# Patient Record
Sex: FEMALE | Race: WHITE | NOT HISPANIC OR LATINO | URBAN - METROPOLITAN AREA
[De-identification: names, ages, dates, MRNs, and addresses within clinical notes are randomized per-mention and may not be internally consistent; named-entity substitution may affect disease eponyms.]

---

## 2022-07-09 ENCOUNTER — TELEPHONE ENCOUNTER (OUTPATIENT)
Dept: URBAN - METROPOLITAN AREA CLINIC 121 | Facility: CLINIC | Age: 72
End: 2022-07-09

## 2022-07-10 ENCOUNTER — TELEPHONE ENCOUNTER (OUTPATIENT)
Dept: URBAN - METROPOLITAN AREA CLINIC 121 | Facility: CLINIC | Age: 72
End: 2022-07-10

## 2022-07-30 ENCOUNTER — TELEPHONE ENCOUNTER (OUTPATIENT)
Age: 72
End: 2022-07-30

## 2022-07-31 ENCOUNTER — TELEPHONE ENCOUNTER (OUTPATIENT)
Age: 72
End: 2022-07-31

## 2023-02-16 ENCOUNTER — OFFICE VISIT (OUTPATIENT)
Dept: URBAN - METROPOLITAN AREA CLINIC 9 | Facility: CLINIC | Age: 73
End: 2023-02-16

## 2023-03-06 ENCOUNTER — OFFICE VISIT (OUTPATIENT)
Dept: URBAN - METROPOLITAN AREA CLINIC 9 | Facility: CLINIC | Age: 73
End: 2023-03-06
Payer: MEDICARE

## 2023-03-06 VITALS
HEIGHT: 63 IN | WEIGHT: 156 LBS | BODY MASS INDEX: 27.64 KG/M2 | SYSTOLIC BLOOD PRESSURE: 120 MMHG | DIASTOLIC BLOOD PRESSURE: 74 MMHG

## 2023-03-06 DIAGNOSIS — K21.9 GASTROESOPHAGEAL REFLUX DISEASE, UNSPECIFIED WHETHER ESOPHAGITIS PRESENT: ICD-10-CM

## 2023-03-06 DIAGNOSIS — R10.11 RUQ ABDOMINAL PAIN: ICD-10-CM

## 2023-03-06 DIAGNOSIS — Z12.11 SCREENING FOR COLON CANCER: ICD-10-CM

## 2023-03-06 PROCEDURE — 99204 OFFICE O/P NEW MOD 45 MIN: CPT | Performed by: STUDENT IN AN ORGANIZED HEALTH CARE EDUCATION/TRAINING PROGRAM

## 2023-03-06 PROCEDURE — 99244 OFF/OP CNSLTJ NEW/EST MOD 40: CPT | Performed by: STUDENT IN AN ORGANIZED HEALTH CARE EDUCATION/TRAINING PROGRAM

## 2023-03-06 RX ORDER — PANTOPRAZOLE SODIUM 20 MG/1
1 TABLET TABLET, DELAYED RELEASE ORAL ONCE A DAY
OUTPATIENT
Start: 2023-03-06

## 2023-03-06 RX ORDER — SERTRALINE 50 MG/1
TAKE 1 TABLET BY MOUTH DAILY TABLET, FILM COATED ORAL
Qty: 90 EACH | Refills: 1 | Status: ACTIVE | COMMUNITY

## 2023-03-06 RX ORDER — SUCRALFATE 1 G/1
1 TABLET ON AN EMPTY STOMACH TABLET ORAL TWICE A DAY
Qty: 60 | Status: ACTIVE | COMMUNITY
Start: 2023-03-06 | End: 2023-04-05

## 2023-03-06 RX ORDER — THYROID 60 MG/1
1 TABLET ON AN EMPTY STOMACH TABLET ORAL ONCE A DAY
Qty: 30 | Status: ACTIVE | COMMUNITY
Start: 2023-03-06

## 2023-03-06 RX ORDER — PANTOPRAZOLE SODIUM 20 MG/1
1 TABLET TABLET, DELAYED RELEASE ORAL ONCE A DAY
Qty: 30 | Status: ACTIVE | COMMUNITY
Start: 2023-03-06

## 2023-03-06 RX ORDER — ROSUVASTATIN CALCIUM 20 MG/1
TABLET, FILM COATED ORAL
Qty: 90 TABLET | Refills: 0 | Status: ACTIVE | COMMUNITY

## 2023-03-06 RX ORDER — FLUTICASONE PROPIONATE 50 UG/1
1 SPRAY IN EACH NOSTRIL SPRAY, METERED NASAL ONCE A DAY
Status: ACTIVE | COMMUNITY
Start: 2023-03-06

## 2023-03-06 RX ORDER — NITROGLYCERIN 0.4 MG/1
AS DIRECTED TABLET SUBLINGUAL
Status: ACTIVE | COMMUNITY
Start: 2023-03-06

## 2023-03-06 RX ORDER — TRAZODONE HYDROCHLORIDE 100 MG/1
1 TABLET AT BEDTIME TABLET ORAL ONCE A DAY
Qty: 30 | Status: ACTIVE | COMMUNITY
Start: 2023-03-06

## 2023-03-06 RX ORDER — PROPRANOLOL HYDROCHLORIDE 10 MG/1
TAKE 1 TABLET BY MOUTH THREE TIMES A DAY TABLET ORAL
Qty: 270 EACH | Refills: 0 | Status: ACTIVE | COMMUNITY

## 2023-03-06 RX ORDER — SUCRALFATE 1 G/1
1 TABLET ON AN EMPTY STOMACH TABLET ORAL TWICE A DAY
OUTPATIENT
Start: 2023-03-06 | End: 2023-04-05

## 2023-03-06 RX ORDER — FEXOFENADINE HYDROCHLORIDE 180 MG/1
1 TABLET SWALLOW WHOLE WITH WATER; DO NOT TAKE WITH FRUIT JUICES TABLET ORAL ONCE A DAY
Qty: 30 | Status: ACTIVE | COMMUNITY
Start: 2023-03-06 | End: 2023-04-05

## 2023-03-06 RX ORDER — PROGESTERONE 100 MG/1
AS DIRECTED CAPSULE ORAL
Status: ACTIVE | COMMUNITY
Start: 2023-03-06

## 2023-03-06 NOTE — HPI-TODAY'S VISIT:
Patient has a history of allergies, GERD, CAD s/p stent 8/2010, hypothyroidism who presents for abd pain  GI Hx: GERD had been well controlled but as of more recently has bothered her. 20 years of RMQ and RUQ abd pain, stabbing pain, worse 15 min after eating,   Denies weight loss, fever, chills, nausea, vomiting, diarrhea.  Denies dysphagia. Denies hematemesis, melena, hematochezia, blood per rectum.  EGD: No recent  Colonoscopy: 10 year ago- no records available.  Imaging/Studies/Procedures: No recent imaging.

## 2023-03-10 LAB
A/G RATIO: 1.7
ALBUMIN: 4.2
ALKALINE PHOSPHATASE: 67
ALT (SGPT): 12
AST (SGOT): 14
BASO (ABSOLUTE): 0
BASOS: 1
BILIRUBIN, TOTAL: 0.6
BUN/CREATININE RATIO: 15
BUN: 14
CALCIUM: 9.1
CARBON DIOXIDE, TOTAL: 23
CHLORIDE: 103
CREATININE: 0.94
EGFR: 65
EOS (ABSOLUTE): 0.2
EOS: 2
GLOBULIN, TOTAL: 2.5
GLUCOSE: 90
HEMATOCRIT: 41.1
HEMOGLOBIN: 14.3
IMMATURE GRANS (ABS): 0.1
IMMATURE GRANULOCYTES: 1
LYMPHS (ABSOLUTE): 1.5
LYMPHS: 19
MCH: 33.3
MCHC: 34.8
MCV: 96
MONOCYTES(ABSOLUTE): 0.6
MONOCYTES: 8
NEUTROPHILS (ABSOLUTE): 5.4
NEUTROPHILS: 69
PLATELETS: 214
POTASSIUM: 4.4
PROTEIN, TOTAL: 6.7
RBC: 4.29
RDW: 12.2
SODIUM: 138
WBC: 7.8

## 2023-03-18 PROBLEM — 235595009: Status: ACTIVE | Noted: 2023-03-18

## 2023-03-24 ENCOUNTER — TELEPHONE ENCOUNTER (OUTPATIENT)
Dept: URBAN - METROPOLITAN AREA CLINIC 9 | Facility: CLINIC | Age: 73
End: 2023-03-24

## 2023-03-30 ENCOUNTER — TELEPHONE ENCOUNTER (OUTPATIENT)
Dept: URBAN - METROPOLITAN AREA CLINIC 9 | Facility: CLINIC | Age: 73
End: 2023-03-30

## 2023-04-10 ENCOUNTER — CLAIMS CREATED FROM THE CLAIM WINDOW (OUTPATIENT)
Dept: URBAN - METROPOLITAN AREA CLINIC 4 | Facility: CLINIC | Age: 73
End: 2023-04-10
Payer: MEDICARE

## 2023-04-10 ENCOUNTER — OFFICE VISIT (OUTPATIENT)
Dept: URBAN - METROPOLITAN AREA SURGERY CENTER 9 | Facility: SURGERY CENTER | Age: 73
End: 2023-04-10
Payer: MEDICARE

## 2023-04-10 DIAGNOSIS — K29.70 CHRONIC ACITVE GASTRITIS (H.PYLORI NEGATIVE): ICD-10-CM

## 2023-04-10 DIAGNOSIS — K62.3 RECTAL PROLAPSE: ICD-10-CM

## 2023-04-10 DIAGNOSIS — K44.9 DIAPHRAGMATIC HERNIA: ICD-10-CM

## 2023-04-10 DIAGNOSIS — K31.7 BENIGN GASTRIC POLYP: ICD-10-CM

## 2023-04-10 DIAGNOSIS — K31.89 ACQUIRED DEFORMITY OF DUODENUM: ICD-10-CM

## 2023-04-10 DIAGNOSIS — K22.89 DILATATION OF ESOPHAGUS: ICD-10-CM

## 2023-04-10 DIAGNOSIS — K31.89 OTHER DISEASES OF STOMACH AND DUODENUM: ICD-10-CM

## 2023-04-10 PROCEDURE — 43239 EGD BIOPSY SINGLE/MULTIPLE: CPT | Performed by: CLINIC/CENTER

## 2023-04-10 PROCEDURE — 88305 TISSUE EXAM BY PATHOLOGIST: CPT | Performed by: PATHOLOGY

## 2023-04-10 PROCEDURE — 43239 EGD BIOPSY SINGLE/MULTIPLE: CPT | Performed by: STUDENT IN AN ORGANIZED HEALTH CARE EDUCATION/TRAINING PROGRAM

## 2023-04-10 PROCEDURE — 43251 EGD REMOVE LESION SNARE: CPT | Performed by: STUDENT IN AN ORGANIZED HEALTH CARE EDUCATION/TRAINING PROGRAM

## 2023-04-10 PROCEDURE — 88312 SPECIAL STAINS GROUP 1: CPT | Performed by: PATHOLOGY

## 2023-04-10 PROCEDURE — 43251 EGD REMOVE LESION SNARE: CPT | Performed by: CLINIC/CENTER

## 2023-04-10 RX ORDER — SERTRALINE 50 MG/1
TAKE 1 TABLET BY MOUTH DAILY TABLET, FILM COATED ORAL
Qty: 90 EACH | Refills: 1 | Status: ACTIVE | COMMUNITY

## 2023-04-10 RX ORDER — PANTOPRAZOLE SODIUM 20 MG/1
1 TABLET TABLET, DELAYED RELEASE ORAL ONCE A DAY
OUTPATIENT
Start: 2023-03-06

## 2023-04-10 RX ORDER — OMEPRAZOLE 40 MG/1
1 CAPSULE 30 MINUTES BEFORE LUNCH OR DINNER CAPSULE, DELAYED RELEASE ORAL ONCE A DAY
Qty: 30 | Refills: 5 | OUTPATIENT
Start: 2023-04-10

## 2023-04-10 RX ORDER — PROPRANOLOL HYDROCHLORIDE 10 MG/1
TAKE 1 TABLET BY MOUTH THREE TIMES A DAY TABLET ORAL
Qty: 270 EACH | Refills: 0 | Status: ACTIVE | COMMUNITY

## 2023-04-10 RX ORDER — FLUTICASONE PROPIONATE 50 UG/1
1 SPRAY IN EACH NOSTRIL SPRAY, METERED NASAL ONCE A DAY
Status: ACTIVE | COMMUNITY
Start: 2023-03-06

## 2023-04-10 RX ORDER — ROSUVASTATIN CALCIUM 20 MG/1
TABLET, FILM COATED ORAL
Qty: 90 TABLET | Refills: 0 | Status: ACTIVE | COMMUNITY

## 2023-04-10 RX ORDER — NITROGLYCERIN 0.4 MG/1
AS DIRECTED TABLET SUBLINGUAL
Status: ACTIVE | COMMUNITY
Start: 2023-03-06

## 2023-04-10 RX ORDER — TRAZODONE HYDROCHLORIDE 100 MG/1
1 TABLET AT BEDTIME TABLET ORAL ONCE A DAY
Qty: 30 | Status: ACTIVE | COMMUNITY
Start: 2023-03-06

## 2023-04-10 RX ORDER — PROGESTERONE 100 MG/1
AS DIRECTED CAPSULE ORAL
Status: ACTIVE | COMMUNITY
Start: 2023-03-06

## 2023-04-10 RX ORDER — THYROID 60 MG/1
1 TABLET ON AN EMPTY STOMACH TABLET ORAL ONCE A DAY
Qty: 30 | Status: ACTIVE | COMMUNITY
Start: 2023-03-06

## 2023-04-10 RX ORDER — PANTOPRAZOLE SODIUM 20 MG/1
1 TABLET TABLET, DELAYED RELEASE ORAL ONCE A DAY
Status: ACTIVE | COMMUNITY
Start: 2023-03-06

## 2023-04-19 ENCOUNTER — TELEPHONE ENCOUNTER (OUTPATIENT)
Dept: URBAN - METROPOLITAN AREA CLINIC 9 | Facility: CLINIC | Age: 73
End: 2023-04-19

## 2023-04-26 ENCOUNTER — TELEPHONE ENCOUNTER (OUTPATIENT)
Dept: URBAN - METROPOLITAN AREA CLINIC 9 | Facility: CLINIC | Age: 73
End: 2023-04-26

## 2023-05-02 ENCOUNTER — CLAIMS CREATED FROM THE CLAIM WINDOW (OUTPATIENT)
Dept: URBAN - METROPOLITAN AREA CLINIC 4 | Facility: CLINIC | Age: 73
End: 2023-05-02
Payer: MEDICARE

## 2023-05-02 ENCOUNTER — OFFICE VISIT (OUTPATIENT)
Dept: URBAN - METROPOLITAN AREA SURGERY CENTER 9 | Facility: SURGERY CENTER | Age: 73
End: 2023-05-02
Payer: MEDICARE

## 2023-05-02 DIAGNOSIS — K57.30 ACQUIRED DIVERTICULOSIS OF COLON: ICD-10-CM

## 2023-05-02 DIAGNOSIS — Z12.11 AVERAGE RISK FOR CRC. DUE TO PT'S CO-MORBID STATE WITH END STAGE DEMENTIA, HIGH RISK FOR ANESTHESIA (PER NEUROLOGY); INABILITY TO TAKE A BOWEL PREP....WOULD NOT ADVISE ANY COLORECTAL CANCER SCREENING INCLUDING STOOL TEST FOR FECAL BLOOD.: ICD-10-CM

## 2023-05-02 DIAGNOSIS — K63.5 BENIGN COLON POLYP: ICD-10-CM

## 2023-05-02 DIAGNOSIS — D12.2 BENIGN NEOPLASM OF ASCENDING COLON: ICD-10-CM

## 2023-05-02 DIAGNOSIS — K64.0 BLEEDING GRADE I HEMORRHOIDS: ICD-10-CM

## 2023-05-02 PROCEDURE — 45385 COLONOSCOPY W/LESION REMOVAL: CPT | Performed by: CLINIC/CENTER

## 2023-05-02 PROCEDURE — 45385 COLONOSCOPY W/LESION REMOVAL: CPT | Performed by: STUDENT IN AN ORGANIZED HEALTH CARE EDUCATION/TRAINING PROGRAM

## 2023-05-02 PROCEDURE — 88305 TISSUE EXAM BY PATHOLOGIST: CPT | Performed by: PATHOLOGY

## 2023-05-02 RX ORDER — OMEPRAZOLE 40 MG/1
1 CAPSULE 30 MINUTES BEFORE LUNCH OR DINNER CAPSULE, DELAYED RELEASE ORAL ONCE A DAY
Qty: 30 | Refills: 5 | Status: ACTIVE | COMMUNITY
Start: 2023-04-10

## 2023-05-02 RX ORDER — ROSUVASTATIN CALCIUM 20 MG/1
TABLET, FILM COATED ORAL
Qty: 90 TABLET | Refills: 0 | Status: ACTIVE | COMMUNITY

## 2023-05-02 RX ORDER — PROGESTERONE 100 MG/1
AS DIRECTED CAPSULE ORAL
Status: ACTIVE | COMMUNITY
Start: 2023-03-06

## 2023-05-02 RX ORDER — PROPRANOLOL HYDROCHLORIDE 10 MG/1
TAKE 1 TABLET BY MOUTH THREE TIMES A DAY TABLET ORAL
Qty: 270 EACH | Refills: 0 | Status: ACTIVE | COMMUNITY

## 2023-05-02 RX ORDER — SERTRALINE 50 MG/1
TAKE 1 TABLET BY MOUTH DAILY TABLET, FILM COATED ORAL
Qty: 90 EACH | Refills: 1 | Status: ACTIVE | COMMUNITY

## 2023-05-02 RX ORDER — TRAZODONE HYDROCHLORIDE 100 MG/1
1 TABLET AT BEDTIME TABLET ORAL ONCE A DAY
Qty: 30 | Status: ACTIVE | COMMUNITY
Start: 2023-03-06

## 2023-05-02 RX ORDER — THYROID 60 MG/1
1 TABLET ON AN EMPTY STOMACH TABLET ORAL ONCE A DAY
Qty: 30 | Status: ACTIVE | COMMUNITY
Start: 2023-03-06

## 2023-05-02 RX ORDER — FLUTICASONE PROPIONATE 50 UG/1
1 SPRAY IN EACH NOSTRIL SPRAY, METERED NASAL ONCE A DAY
Status: ACTIVE | COMMUNITY
Start: 2023-03-06

## 2023-05-02 RX ORDER — NITROGLYCERIN 0.4 MG/1
AS DIRECTED TABLET SUBLINGUAL
Status: ACTIVE | COMMUNITY
Start: 2023-03-06

## 2023-05-09 ENCOUNTER — OFFICE VISIT (OUTPATIENT)
Dept: URBAN - METROPOLITAN AREA CLINIC 9 | Facility: CLINIC | Age: 73
End: 2023-05-09
Payer: MEDICARE

## 2023-05-09 VITALS
SYSTOLIC BLOOD PRESSURE: 108 MMHG | BODY MASS INDEX: 28.17 KG/M2 | WEIGHT: 159 LBS | HEIGHT: 63 IN | DIASTOLIC BLOOD PRESSURE: 68 MMHG

## 2023-05-09 DIAGNOSIS — R10.11 RUQ ABDOMINAL PAIN: ICD-10-CM

## 2023-05-09 DIAGNOSIS — K80.50 BILIARY COLIC: ICD-10-CM

## 2023-05-09 PROCEDURE — 99214 OFFICE O/P EST MOD 30 MIN: CPT | Performed by: STUDENT IN AN ORGANIZED HEALTH CARE EDUCATION/TRAINING PROGRAM

## 2023-05-09 RX ORDER — TRAZODONE HYDROCHLORIDE 100 MG/1
1 TABLET AT BEDTIME TABLET ORAL ONCE A DAY
Qty: 30 | Status: ACTIVE | COMMUNITY
Start: 2023-03-06

## 2023-05-09 RX ORDER — FLUTICASONE PROPIONATE 50 UG/1
1 SPRAY IN EACH NOSTRIL SPRAY, METERED NASAL ONCE A DAY
Status: ACTIVE | COMMUNITY
Start: 2023-03-06

## 2023-05-09 RX ORDER — SERTRALINE 50 MG/1
TAKE 1 TABLET BY MOUTH DAILY TABLET, FILM COATED ORAL
Qty: 90 EACH | Refills: 1 | Status: ACTIVE | COMMUNITY

## 2023-05-09 RX ORDER — ROSUVASTATIN CALCIUM 20 MG/1
TABLET, FILM COATED ORAL
Qty: 90 TABLET | Refills: 0 | Status: ACTIVE | COMMUNITY

## 2023-05-09 RX ORDER — PROPRANOLOL HYDROCHLORIDE 10 MG/1
TAKE 1 TABLET BY MOUTH THREE TIMES A DAY TABLET ORAL
Qty: 270 EACH | Refills: 0 | Status: ACTIVE | COMMUNITY

## 2023-05-09 RX ORDER — THYROID 60 MG/1
1 TABLET ON AN EMPTY STOMACH TABLET ORAL ONCE A DAY
Qty: 30 | Status: ACTIVE | COMMUNITY
Start: 2023-03-06

## 2023-05-09 RX ORDER — PROGESTERONE 100 MG/1
AS DIRECTED CAPSULE ORAL
Status: ACTIVE | COMMUNITY
Start: 2023-03-06

## 2023-05-09 RX ORDER — OMEPRAZOLE 40 MG/1
1 CAPSULE 30 MINUTES BEFORE LUNCH OR DINNER CAPSULE, DELAYED RELEASE ORAL ONCE A DAY
Qty: 30 | Refills: 5 | Status: ACTIVE | COMMUNITY
Start: 2023-04-10

## 2023-05-09 RX ORDER — NITROGLYCERIN 0.4 MG/1
AS DIRECTED TABLET SUBLINGUAL
Status: ACTIVE | COMMUNITY
Start: 2023-03-06

## 2023-05-09 NOTE — HPI-TODAY'S VISIT:
Patient has a history of allergies, GERD, CAD s/p stent 8/2010, hypothyroidism who presents for follow up.  GI Hx: GERD had been well controlled but as of more recently has bothered her. 20 years of RMQ and RUQ abd pain, stabbing pain, worse 15 min after eating,   5/9/23-  Still having abd pain in RMQ/RUQ. No change with high dose PPI. No findings to explain pain on endoscopies or CT. Has some back issues and could be referred pain. Having 1 BM every 2 days. So still not optimized in terms of bowel regimen to rule out constipation/gas/back up distension. Denies weight loss, fever, chills, nausea, vomiting, diarrhea.  Denies dysphagia. Denies hematemesis, melena, hematochezia, blood per rectum.  EGD: No recent 4/10/23- C1M1 barretts? 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 Start omeprazole 40mg daily.  Colonoscopy: 10 year ago- no records available. 5/2/23- one 7mm polyp in asc colon (bx: TA), one 6mm polyp in transverse colon (bx: HP), two 5-8 mm polyps in rectosigmoid colon (bx: TAs), sigmoid divertic, IH. Repeat 3 years.  Imaging/Studies/Procedures: RUQ U/S 3/18/23- hepatic steatosis CT A/P 3/18/23- benign cyst of L kidney, hepatic steatosis.

## 2023-05-09 NOTE — PHYSICAL EXAM HENT:
Normocephalic, atraumatic. Oral cavity and pharynx appearance normal [FreeTextEntry1] : Mainly acid reflux issue. \par Referred to GI. RTC in 4M

## 2023-05-30 ENCOUNTER — OFFICE VISIT (OUTPATIENT)
Dept: URBAN - METROPOLITAN AREA CLINIC 9 | Facility: CLINIC | Age: 73
End: 2023-05-30

## 2023-05-31 ENCOUNTER — OFFICE VISIT (OUTPATIENT)
Dept: URBAN - METROPOLITAN AREA CLINIC 9 | Facility: CLINIC | Age: 73
End: 2023-05-31
Payer: MEDICARE

## 2023-05-31 ENCOUNTER — WEB ENCOUNTER (OUTPATIENT)
Dept: URBAN - METROPOLITAN AREA CLINIC 9 | Facility: CLINIC | Age: 73
End: 2023-05-31

## 2023-05-31 VITALS
BODY MASS INDEX: 28.17 KG/M2 | DIASTOLIC BLOOD PRESSURE: 70 MMHG | SYSTOLIC BLOOD PRESSURE: 110 MMHG | HEIGHT: 63 IN | WEIGHT: 159 LBS

## 2023-05-31 DIAGNOSIS — K90.89 BILE ACID MALABSORPTION SYNDROME: ICD-10-CM

## 2023-05-31 DIAGNOSIS — K80.50 BILIARY COLIC: ICD-10-CM

## 2023-05-31 DIAGNOSIS — R10.11 RUQ ABDOMINAL PAIN: ICD-10-CM

## 2023-05-31 PROBLEM — 44332000: Status: ACTIVE | Noted: 2023-05-31

## 2023-05-31 PROCEDURE — 99214 OFFICE O/P EST MOD 30 MIN: CPT | Performed by: STUDENT IN AN ORGANIZED HEALTH CARE EDUCATION/TRAINING PROGRAM

## 2023-05-31 RX ORDER — ROSUVASTATIN CALCIUM 20 MG/1
TABLET, FILM COATED ORAL
Qty: 90 TABLET | Refills: 0 | Status: ACTIVE | COMMUNITY

## 2023-05-31 RX ORDER — CHOLESTYRAMINE 4 G/9G
1 PACKET MIXED WITH WATER OR NON-CARBONATED DRINK POWDER, FOR SUSPENSION ORAL ONCE A DAY
Qty: 30 | Refills: 1 | OUTPATIENT
Start: 2023-05-31

## 2023-05-31 RX ORDER — OMEPRAZOLE 40 MG/1
1 CAPSULE 30 MINUTES BEFORE LUNCH OR DINNER CAPSULE, DELAYED RELEASE ORAL ONCE A DAY
Qty: 30 | Refills: 5 | Status: ACTIVE | COMMUNITY
Start: 2023-04-10

## 2023-05-31 RX ORDER — PROPRANOLOL HYDROCHLORIDE 10 MG/1
TAKE 1 TABLET BY MOUTH THREE TIMES A DAY TABLET ORAL
Qty: 270 EACH | Refills: 0 | Status: ACTIVE | COMMUNITY

## 2023-05-31 RX ORDER — TRAZODONE HYDROCHLORIDE 100 MG/1
1 TABLET AT BEDTIME TABLET ORAL ONCE A DAY
Qty: 30 | Status: ACTIVE | COMMUNITY
Start: 2023-03-06

## 2023-05-31 RX ORDER — PROGESTERONE 100 MG/1
AS DIRECTED CAPSULE ORAL
Status: ACTIVE | COMMUNITY
Start: 2023-03-06

## 2023-05-31 RX ORDER — THYROID 60 MG/1
1 TABLET ON AN EMPTY STOMACH TABLET ORAL ONCE A DAY
Qty: 30 | Status: ACTIVE | COMMUNITY
Start: 2023-03-06

## 2023-05-31 RX ORDER — SERTRALINE 50 MG/1
TAKE 1 TABLET BY MOUTH DAILY TABLET, FILM COATED ORAL
Qty: 90 EACH | Refills: 1 | Status: ACTIVE | COMMUNITY

## 2023-05-31 RX ORDER — FLUTICASONE PROPIONATE 50 UG/1
1 SPRAY IN EACH NOSTRIL SPRAY, METERED NASAL ONCE A DAY
Status: ACTIVE | COMMUNITY
Start: 2023-03-06

## 2023-05-31 RX ORDER — NITROGLYCERIN 0.4 MG/1
AS DIRECTED TABLET SUBLINGUAL
Status: ACTIVE | COMMUNITY
Start: 2023-03-06

## 2023-05-31 NOTE — HPI-TODAY'S VISIT:
Patient has a history of allergies, GERD, CAD s/p stent 8/2010, hypothyroidism who presents for follow up.  GI Hx: 3/2023- GERD had been well controlled but as of more recently has bothered her. 20 years of RMQ and RUQ abd pain, stabbing pain, worse 15 min after eating,  5/9/23-  Still having abd pain in RMQ/RUQ. No change with high dose PPI. No findings to explain pain on endoscopies or CT. Has some back issues and could be referred pain. Having 1 BM every 2 days. So still not optimized in terms of bowel regimen to rule out constipation/gas/back up distension. 5/31/23- Baseline RUQ pain, worse during eating and radiating into back. Worse immeidately after eating. No associated nausea/vomiting/early satiety to suggest gastroparesis.  Denies weight loss, fever, chills, nausea, vomiting, diarrhea.  Denies dysphagia. Denies hematemesis, melena, hematochezia, blood per rectum.  EGD: 4/10/23- C1M1 barretts? (bx: normal), 1cm HH, mild gastritis (bx: PPI effect), one 6mm adenomatous appearing gastric polyp (resected: mucosal prolapse polyp), multiple small gastric fundic appearing gastric polyps (bx: polypoid foveolar hyperplasia), duodenitis (bx: normal). Start omeprazole 40mg daily.  Colonoscopy: 10 year ago- no records available. 5/2/23- one 7mm polyp in asc colon (bx: TA), one 6mm polyp in transverse colon (bx: HP), two 5-8 mm polyps in rectosigmoid colon (bx: TAs), sigmoid divertic, IH. Repeat 3 years.  Imaging/Studies/Procedures: 3/9/23- CBC, CMP nromal. RUQ U/S 3/18/23- hepatic steatosis CT A/P 3/18/23- benign cyst of L kidney, hepatic steatosis. HIDA 5/9/23- normal. Statement Selected

## 2023-06-05 ENCOUNTER — TELEPHONE ENCOUNTER (OUTPATIENT)
Dept: URBAN - METROPOLITAN AREA CLINIC 8 | Facility: CLINIC | Age: 73
End: 2023-06-05

## 2023-06-14 ENCOUNTER — TELEPHONE ENCOUNTER (OUTPATIENT)
Dept: URBAN - METROPOLITAN AREA CLINIC 8 | Facility: CLINIC | Age: 73
End: 2023-06-14

## 2023-06-20 ENCOUNTER — TELEPHONE ENCOUNTER (OUTPATIENT)
Dept: URBAN - METROPOLITAN AREA CLINIC 8 | Facility: CLINIC | Age: 73
End: 2023-06-20

## 2023-07-21 ENCOUNTER — TELEPHONE ENCOUNTER (OUTPATIENT)
Dept: URBAN - METROPOLITAN AREA CLINIC 8 | Facility: CLINIC | Age: 73
End: 2023-07-21

## 2023-09-27 ENCOUNTER — OFFICE VISIT (OUTPATIENT)
Dept: URBAN - METROPOLITAN AREA CLINIC 9 | Facility: CLINIC | Age: 73
End: 2023-09-27
Payer: MEDICARE

## 2023-09-27 VITALS
SYSTOLIC BLOOD PRESSURE: 120 MMHG | WEIGHT: 158 LBS | BODY MASS INDEX: 28 KG/M2 | DIASTOLIC BLOOD PRESSURE: 72 MMHG | HEIGHT: 63 IN

## 2023-09-27 DIAGNOSIS — R19.7 DIARRHEA, UNSPECIFIED TYPE: ICD-10-CM

## 2023-09-27 DIAGNOSIS — K21.9 GASTROESOPHAGEAL REFLUX DISEASE, UNSPECIFIED WHETHER ESOPHAGITIS PRESENT: ICD-10-CM

## 2023-09-27 DIAGNOSIS — R10.11 RUQ ABDOMINAL PAIN: ICD-10-CM

## 2023-09-27 PROCEDURE — 99214 OFFICE O/P EST MOD 30 MIN: CPT | Performed by: STUDENT IN AN ORGANIZED HEALTH CARE EDUCATION/TRAINING PROGRAM

## 2023-09-27 RX ORDER — OMEPRAZOLE 20 MG/1
1 CAPSULE 30 MINUTES BEFORE A MEAL CAPSULE, DELAYED RELEASE ORAL ONCE A DAY
Qty: 30 | Refills: 3 | OUTPATIENT
Start: 2023-04-10

## 2023-09-27 RX ORDER — OMEPRAZOLE 40 MG/1
1 CAPSULE 30 MINUTES BEFORE LUNCH OR DINNER CAPSULE, DELAYED RELEASE ORAL ONCE A DAY
Qty: 30 | Refills: 5 | Status: ACTIVE | COMMUNITY
Start: 2023-04-10

## 2023-09-27 RX ORDER — TRAZODONE HYDROCHLORIDE 100 MG/1
1 TABLET AT BEDTIME TABLET ORAL ONCE A DAY
Qty: 30 | Status: ACTIVE | COMMUNITY
Start: 2023-03-06

## 2023-09-27 RX ORDER — PROGESTERONE 100 MG/1
AS DIRECTED CAPSULE ORAL
Status: ACTIVE | COMMUNITY
Start: 2023-03-06

## 2023-09-27 RX ORDER — ROSUVASTATIN CALCIUM 20 MG/1
TABLET, FILM COATED ORAL
Qty: 90 TABLET | Refills: 0 | Status: ACTIVE | COMMUNITY

## 2023-09-27 RX ORDER — THYROID 60 MG/1
1 TABLET ON AN EMPTY STOMACH TABLET ORAL ONCE A DAY
Qty: 30 | Status: ACTIVE | COMMUNITY
Start: 2023-03-06

## 2023-09-27 RX ORDER — PSYLLIUM HUSK (WITH SUGAR) 3 G/12 G
AS DIRECTED POWDER (GRAM) ORAL
OUTPATIENT
Start: 2023-09-27

## 2023-09-27 RX ORDER — FLUTICASONE PROPIONATE 50 UG/1
1 SPRAY IN EACH NOSTRIL SPRAY, METERED NASAL ONCE A DAY
Status: ACTIVE | COMMUNITY
Start: 2023-03-06

## 2023-09-27 RX ORDER — PROPRANOLOL HYDROCHLORIDE 10 MG/1
TAKE 1 TABLET BY MOUTH THREE TIMES A DAY TABLET ORAL
Qty: 270 EACH | Refills: 0 | Status: ACTIVE | COMMUNITY

## 2023-09-27 RX ORDER — NITROGLYCERIN 0.4 MG/1
AS DIRECTED TABLET SUBLINGUAL
Status: ACTIVE | COMMUNITY
Start: 2023-03-06

## 2023-09-27 RX ORDER — SERTRALINE 50 MG/1
TAKE 1 TABLET BY MOUTH DAILY TABLET, FILM COATED ORAL
Qty: 90 EACH | Refills: 1 | Status: ACTIVE | COMMUNITY

## 2023-09-27 NOTE — HPI-TODAY'S VISIT:
Patient has a history of allergies, GERD, CAD s/p stent 8/2010, hypothyroidism who presents for follow up.  GI Hx: 3/2023- GERD had been well controlled but as of more recently has bothered her. 20 years of RMQ and RUQ abd pain, stabbing pain, worse 15 min after eating,  5/9/23-  Still having abd pain in RMQ/RUQ. No change with high dose PPI. No findings to explain pain on endoscopies or CT. Has some back issues and could be referred pain. Having 1 BM every 2 days. So still not optimized in terms of bowel regimen to rule out constipation/gas/back up distension. 5/31/23- Baseline RUQ pain, worse during eating and radiating into back. Worse immeidately after eating. No associated nausea/vomiting/early satiety to suggest gastroparesis. 9/27/23- Only on omeprazole 20mg daily. Started cholestyramine but made her sick and she stopped it. Referred to [facility].  Last 2.5 weeks now with diarrhea (5-6BM/day)-- previously had consitpation. Takes immodium PRN. Discussed with her the risks and to stop this if possible. Of note, PCP increased rosuvastatin to 40mg (from 20) 3 weeks ago-- microscopic colitis?  EGD: 4/10/23- C1M1 barretts? (bx: normal), 1cm HH, mild gastritis (bx: PPI effect), one 6mm adenomatous appearing gastric polyp (resected: mucosal prolapse polyp), multiple small gastric fundic appearing gastric polyps (bx: polypoid foveolar hyperplasia), duodenitis (bx: normal). Start omeprazole 40mg daily.  Colonoscopy: 10 year ago- no records available. 5/2/23- one 7mm polyp in asc colon (bx: TA), one 6mm polyp in transverse colon (bx: HP), two 5-8 mm polyps in rectosigmoid colon (bx: TAs), sigmoid divertic, IH. Repeat 3 years.  Imaging/Studies/Procedures: 3/9/23- CBC, CMP nromal. RUQ U/S 3/18/23- hepatic steatosis CT A/P 3/18/23- benign cyst of L kidney, hepatic steatosis. HIDA 5/9/23- normal. 6/19/23- CBC, CMP (creat 1.11),  CTA 6/9/23- no signfiicant bowel vessel stenosis, small HH.

## 2023-10-02 ENCOUNTER — LAB OUTSIDE AN ENCOUNTER (OUTPATIENT)
Dept: URBAN - METROPOLITAN AREA CLINIC 7 | Facility: CLINIC | Age: 73
End: 2023-10-02

## 2023-10-09 LAB
C DIFFICILE TOXINS A+B, EIA: NEGATIVE
C DIFFICILE, CYTOTOXIN B: (no result)
C-REACTIVE PROTEIN, QUANT: 2
CALPROTECTIN, FECAL: 102
CAMPYLOBACTER CULTURE: (no result)
DEAMIDATED GLIADIN ABS, IGA: 7
E COLI SHIGA TOXIN EIA: NEGATIVE
IMMUNOGLOBULIN A, QN, SERUM: 217
PANCREATIC ELASTASE, FECAL: 78
SALMONELLA/SHIGELLA SCREEN: (no result)
T-TRANSGLUTAMINASE (TTG) IGA: <2
TSH: 1.15

## 2023-10-26 ENCOUNTER — DASHBOARD ENCOUNTERS (OUTPATIENT)
Age: 73
End: 2023-10-26

## 2023-10-26 ENCOUNTER — OFFICE VISIT (OUTPATIENT)
Dept: URBAN - METROPOLITAN AREA CLINIC 9 | Facility: CLINIC | Age: 73
End: 2023-10-26
Payer: MEDICARE

## 2023-10-26 ENCOUNTER — OFFICE VISIT (OUTPATIENT)
Dept: URBAN - METROPOLITAN AREA CLINIC 9 | Facility: CLINIC | Age: 73
End: 2023-10-26

## 2023-10-26 VITALS
HEIGHT: 63 IN | BODY MASS INDEX: 27.82 KG/M2 | SYSTOLIC BLOOD PRESSURE: 118 MMHG | DIASTOLIC BLOOD PRESSURE: 79 MMHG | WEIGHT: 157 LBS

## 2023-10-26 DIAGNOSIS — Z13.21 ENCOUNTER FOR VITAMIN DEFICIENCY SCREENING: ICD-10-CM

## 2023-10-26 DIAGNOSIS — R10.11 RUQ ABDOMINAL PAIN: ICD-10-CM

## 2023-10-26 DIAGNOSIS — R19.7 DIARRHEA, UNSPECIFIED TYPE: ICD-10-CM

## 2023-10-26 DIAGNOSIS — K21.9 GASTROESOPHAGEAL REFLUX DISEASE, UNSPECIFIED WHETHER ESOPHAGITIS PRESENT: ICD-10-CM

## 2023-10-26 DIAGNOSIS — K86.81 EXOCRINE PANCREATIC INSUFFICIENCY: ICD-10-CM

## 2023-10-26 PROCEDURE — 99214 OFFICE O/P EST MOD 30 MIN: CPT | Performed by: STUDENT IN AN ORGANIZED HEALTH CARE EDUCATION/TRAINING PROGRAM

## 2023-10-26 RX ORDER — PROGESTERONE 100 MG/1
AS DIRECTED CAPSULE ORAL
Status: ACTIVE | COMMUNITY
Start: 2023-03-06

## 2023-10-26 RX ORDER — FLUTICASONE PROPIONATE 50 UG/1
1 SPRAY IN EACH NOSTRIL SPRAY, METERED NASAL ONCE A DAY
Status: ACTIVE | COMMUNITY
Start: 2023-03-06

## 2023-10-26 RX ORDER — TRAZODONE HYDROCHLORIDE 100 MG/1
1 TABLET AT BEDTIME TABLET ORAL ONCE A DAY
Qty: 30 | Status: ACTIVE | COMMUNITY
Start: 2023-03-06

## 2023-10-26 RX ORDER — OMEPRAZOLE 20 MG/1
1 CAPSULE 30 MINUTES BEFORE A MEAL CAPSULE, DELAYED RELEASE ORAL ONCE A DAY
Qty: 30 | Refills: 3 | OUTPATIENT

## 2023-10-26 RX ORDER — SERTRALINE 50 MG/1
TAKE 1 TABLET BY MOUTH DAILY TABLET, FILM COATED ORAL
Qty: 90 EACH | Refills: 1 | Status: ACTIVE | COMMUNITY

## 2023-10-26 RX ORDER — NITROGLYCERIN 0.4 MG/1
AS DIRECTED TABLET SUBLINGUAL
Status: ACTIVE | COMMUNITY
Start: 2023-03-06

## 2023-10-26 RX ORDER — THYROID 60 MG/1
1 TABLET ON AN EMPTY STOMACH TABLET ORAL ONCE A DAY
Qty: 30 | Status: ACTIVE | COMMUNITY
Start: 2023-03-06

## 2023-10-26 RX ORDER — PROPRANOLOL HYDROCHLORIDE 10 MG/1
TAKE 1 TABLET BY MOUTH THREE TIMES A DAY TABLET ORAL
Qty: 270 EACH | Refills: 0 | Status: ACTIVE | COMMUNITY

## 2023-10-26 RX ORDER — ROSUVASTATIN CALCIUM 20 MG/1
TABLET, FILM COATED ORAL
Qty: 90 TABLET | Refills: 0 | Status: ACTIVE | COMMUNITY

## 2023-10-26 RX ORDER — PSYLLIUM HUSK (WITH SUGAR) 3 G/12 G
AS DIRECTED POWDER (GRAM) ORAL
Status: ACTIVE | COMMUNITY
Start: 2023-09-27

## 2023-10-26 RX ORDER — OMEPRAZOLE 20 MG/1
1 CAPSULE 30 MINUTES BEFORE A MEAL CAPSULE, DELAYED RELEASE ORAL ONCE A DAY
Qty: 30 | Refills: 3 | Status: ACTIVE | COMMUNITY
Start: 2023-04-10

## 2023-10-26 RX ORDER — PSYLLIUM HUSK (WITH SUGAR) 3 G/12 G
AS DIRECTED POWDER (GRAM) ORAL
OUTPATIENT

## 2023-10-26 NOTE — HPI-TODAY'S VISIT:
Patient has a history of allergies, GERD, CAD s/p stent 8/2010, hypothyroidism, EPI,  who presents for follow up.  GI Hx: 3/2023- GERD had been well controlled but as of more recently has bothered her. 20 years of RMQ and RUQ abd pain, stabbing pain, worse 15 min after eating,  5/9/23-  Still having abd pain in RMQ/RUQ. No change with high dose PPI. No findings to explain pain on endoscopies or CT. Has some back issues and could be referred pain. Having 1 BM every 2 days. So still not optimized in terms of bowel regimen to rule out constipation/gas/back up distension. 5/31/23- Baseline RUQ pain, worse during eating and radiating into back. Worse immeidately after eating. No associated nausea/vomiting/early satiety to suggest gastroparesis. 9/27/23- Only on omeprazole 20mg daily. Started cholestyramine but made her sick and she stopped it. Referred to [facility].  Last 2.5 weeks now with diarrhea (5-6BM/day)-- previously had consitpation. Takes immodium PRN. Discussed with her the risks and to stop this if possible. Of note, PCP increased rosuvastatin to 40mg (from 20) 3 weeks ago-- microscopic colitis?  10/26/23- Taking generic fiber three times a day (psyllium husk). Much imrpoved diarrhea. Having 1-2 BM/day. Discussed diagnosis of EPI.   EGD: 4/10/23- C1M1 barretts? (bx: normal), 1cm HH, mild gastritis (bx: PPI effect), one 6mm adenomatous appearing gastric polyp (resected: mucosal prolapse polyp), multiple small gastric fundic appearing gastric polyps (bx: polypoid foveolar hyperplasia), duodenitis (bx: normal). Start omeprazole 40mg daily.  Colonoscopy: 10 year ago- no records available. 5/2/23- one 7mm polyp in asc colon (bx: TA), one 6mm polyp in transverse colon (bx: HP), two 5-8 mm polyps in rectosigmoid colon (bx: TAs), sigmoid divertic, IH. Repeat 3 years.  Imaging/Studies/Procedures: 3/9/23- CBC, CMP nromal. RUQ U/S 3/18/23- hepatic steatosis CT A/P 3/18/23- benign cyst of L kidney, hepatic steatosis. HIDA 5/9/23- normal. CTA 6/9/23- no signfiicant bowel vessel stenosis, small HH. 6/19/23- CBC, CMP (creat 1.11), 10/2023- celiac test, CRP, stool culture, TSH, C diff normal. Calprotectin 102 (borderline normal). Elastase 78 (L)- suggestive of pancreatic insufficiency.

## 2023-11-13 ENCOUNTER — LAB OUTSIDE AN ENCOUNTER (OUTPATIENT)
Dept: URBAN - METROPOLITAN AREA CLINIC 7 | Facility: CLINIC | Age: 73
End: 2023-11-13

## 2023-11-19 LAB
FOLATE (FOLIC ACID), SERUM: 15.6
VITAMIN A: 58.7
VITAMIN B12: 683
VITAMIN D, 25-HYDROXY: 75.1
VITAMIN E(ALPHA TOCOPHEROL): 28.4
VITAMIN E(GAMMA TOCOPHEROL): 0.2
VITAMIN K1: 0.59